# Patient Record
Sex: MALE | Race: WHITE | NOT HISPANIC OR LATINO | Employment: UNEMPLOYED | URBAN - METROPOLITAN AREA
[De-identification: names, ages, dates, MRNs, and addresses within clinical notes are randomized per-mention and may not be internally consistent; named-entity substitution may affect disease eponyms.]

---

## 2019-04-21 ENCOUNTER — HOSPITAL ENCOUNTER (INPATIENT)
Facility: HOSPITAL | Age: 19
LOS: 2 days | Discharge: HOME | DRG: 882 | End: 2019-04-23
Attending: PSYCHIATRY & NEUROLOGY | Admitting: PSYCHIATRY & NEUROLOGY
Payer: COMMERCIAL

## 2019-04-21 DIAGNOSIS — R45.851 DEPRESSION WITH SUICIDAL IDEATION: ICD-10-CM

## 2019-04-21 DIAGNOSIS — F39 MOOD DISORDER: Primary | ICD-10-CM

## 2019-04-21 DIAGNOSIS — F32.A DEPRESSION: ICD-10-CM

## 2019-04-21 DIAGNOSIS — F32.A DEPRESSION WITH SUICIDAL IDEATION: ICD-10-CM

## 2019-04-21 PROCEDURE — 11400000 HC PSYCH PRIVATE ROOM

## 2019-04-21 RX ORDER — ACETAMINOPHEN 325 MG/1
650 TABLET ORAL EVERY 6 HOURS PRN
Status: DISCONTINUED | OUTPATIENT
Start: 2019-04-21 | End: 2019-04-23 | Stop reason: HOSPADM

## 2019-04-21 RX ORDER — MAG HYDROX/ALUMINUM HYD/SIMETH 200-200-20
30 SUSPENSION, ORAL (FINAL DOSE FORM) ORAL EVERY 6 HOURS PRN
Status: DISCONTINUED | OUTPATIENT
Start: 2019-04-21 | End: 2019-04-23 | Stop reason: HOSPADM

## 2019-04-21 RX ORDER — LOPERAMIDE HYDROCHLORIDE 2 MG/1
2 CAPSULE ORAL
Status: DISCONTINUED | OUTPATIENT
Start: 2019-04-21 | End: 2019-04-23 | Stop reason: HOSPADM

## 2019-04-21 RX ORDER — OLANZAPINE 10 MG/2ML
10 INJECTION, POWDER, FOR SOLUTION INTRAMUSCULAR EVERY 4 HOURS PRN
Status: DISCONTINUED | OUTPATIENT
Start: 2019-04-21 | End: 2019-04-23 | Stop reason: HOSPADM

## 2019-04-21 RX ORDER — FOLIC ACID 1 MG/1
1 TABLET ORAL DAILY
Status: DISCONTINUED | OUTPATIENT
Start: 2019-04-22 | End: 2019-04-23 | Stop reason: HOSPADM

## 2019-04-21 RX ORDER — HYDROXYZINE PAMOATE 50 MG/1
50 CAPSULE ORAL EVERY 6 HOURS PRN
Status: DISCONTINUED | OUTPATIENT
Start: 2019-04-21 | End: 2019-04-23 | Stop reason: HOSPADM

## 2019-04-21 RX ORDER — OLANZAPINE 10 MG/1
10 TABLET ORAL EVERY 4 HOURS PRN
Status: DISCONTINUED | OUTPATIENT
Start: 2019-04-21 | End: 2019-04-23 | Stop reason: HOSPADM

## 2019-04-21 RX ORDER — IBUPROFEN 200 MG
1 TABLET ORAL DAILY PRN
Status: DISCONTINUED | OUTPATIENT
Start: 2019-04-21 | End: 2019-04-23 | Stop reason: HOSPADM

## 2019-04-21 RX ORDER — DOCUSATE SODIUM 100 MG/1
100 CAPSULE, LIQUID FILLED ORAL DAILY PRN
Status: DISCONTINUED | OUTPATIENT
Start: 2019-04-21 | End: 2019-04-23 | Stop reason: HOSPADM

## 2019-04-21 NOTE — PSYCH
Pt accepted for admission by Dr Gibbs.Report received from Florecita JOSEPH.Pt arrived to unit at 401 pm.Prior to entry on unit pt scanned per security wand.Upon entry on unit pt received a body assessment.Pt [facility] at [facility] today.Pt depressed over the break up with his girlfriend.Since the break up pt has had a loss of appetite,has been sleeping excessively,and has been agitated.ER doctor reports possible suicidal thoughts.Pt with hx of Bipolar,ADHD,and sub abuse.Pt reports he stopped drinking two weeks ago.Pt also reports he stopped using street drugs several years ago.Pt's girlfriend broke up with pt due to his violent angry out bursts.Pt became angry with girlfriend after she told him he could not go somewhere with her.Pt broke some thing he had given her.Pt said things he regrets.Pt reports this is a pattern with him.Pt reports he used to be on Seroquel but stopped taking due to it made him worse.Pt was told by his girlfriend he needed to get help for his out of control behavior.Pt also reports social anxiety.Pt given a unit tour and educated on unit rules and program.

## 2019-04-22 PROBLEM — F39 MOOD DISORDER: Status: ACTIVE | Noted: 2019-04-22

## 2019-04-22 LAB
CHOLEST SERPL-MCNC: 107 MG/DL (ref 120–199)
CHOLEST/HDLC SERPL: 2.5 {RATIO} (ref 2–5)
ESTIMATED AVG GLUCOSE: 94 MG/DL (ref 68–131)
HBA1C MFR BLD HPLC: 4.9 % (ref 4–5.6)
HDLC SERPL-MCNC: 43 MG/DL (ref 40–75)
HDLC SERPL: 40.2 % (ref 20–50)
LDLC SERPL CALC-MCNC: 58 MG/DL (ref 63–159)
NONHDLC SERPL-MCNC: 64 MG/DL
TRIGL SERPL-MCNC: 30 MG/DL (ref 30–150)

## 2019-04-22 PROCEDURE — 90833 PSYTX W PT W E/M 30 MIN: CPT | Mod: ,,, | Performed by: PSYCHIATRY & NEUROLOGY

## 2019-04-22 PROCEDURE — 99254 IP/OBS CNSLTJ NEW/EST MOD 60: CPT | Mod: ,,, | Performed by: NURSE PRACTITIONER

## 2019-04-22 PROCEDURE — 36415 COLL VENOUS BLD VENIPUNCTURE: CPT

## 2019-04-22 PROCEDURE — 80061 LIPID PANEL: CPT

## 2019-04-22 PROCEDURE — 11400000 HC PSYCH PRIVATE ROOM

## 2019-04-22 PROCEDURE — 99223 PR INITIAL HOSPITAL CARE,LEVL III: ICD-10-PCS | Mod: ,,, | Performed by: PSYCHIATRY & NEUROLOGY

## 2019-04-22 PROCEDURE — 83036 HEMOGLOBIN GLYCOSYLATED A1C: CPT

## 2019-04-22 PROCEDURE — 99254 PR INITIAL INPATIENT CONSULT,LEVL IV: ICD-10-PCS | Mod: ,,, | Performed by: NURSE PRACTITIONER

## 2019-04-22 PROCEDURE — 90833 PR PSYCHOTHERAPY W/PATIENT W/E&M, 30 MIN (ADD ON): ICD-10-PCS | Mod: ,,, | Performed by: PSYCHIATRY & NEUROLOGY

## 2019-04-22 PROCEDURE — 25000003 PHARM REV CODE 250: Performed by: PSYCHIATRY & NEUROLOGY

## 2019-04-22 PROCEDURE — 99223 1ST HOSP IP/OBS HIGH 75: CPT | Mod: ,,, | Performed by: PSYCHIATRY & NEUROLOGY

## 2019-04-22 RX ORDER — ESCITALOPRAM OXALATE 5 MG/1
5 TABLET ORAL DAILY
Status: DISCONTINUED | OUTPATIENT
Start: 2019-04-22 | End: 2019-04-23 | Stop reason: HOSPADM

## 2019-04-22 RX ORDER — CLONIDINE HYDROCHLORIDE 0.1 MG/1
0.1 TABLET ORAL 2 TIMES DAILY
Status: DISCONTINUED | OUTPATIENT
Start: 2019-04-22 | End: 2019-04-23 | Stop reason: HOSPADM

## 2019-04-22 RX ADMIN — ALUMINUM HYDROXIDE, MAGNESIUM HYDROXIDE, AND SIMETHICONE 30 ML: 200; 200; 20 SUSPENSION ORAL at 12:04

## 2019-04-22 RX ADMIN — FOLIC ACID 1 MG: 1 TABLET ORAL at 08:04

## 2019-04-22 RX ADMIN — THERA TABS 1 TABLET: TAB at 08:04

## 2019-04-22 RX ADMIN — ESCITALOPRAM OXALATE 5 MG: 5 TABLET, FILM COATED ORAL at 02:04

## 2019-04-22 RX ADMIN — CLONIDINE HYDROCHLORIDE 0.1 MG: 0.1 TABLET ORAL at 08:04

## 2019-04-22 NOTE — PLAN OF CARE
Problem: Adult Behavioral Health Plan of Care  Goal: Plan of Care Review  Outcome: Ongoing (interventions implemented as appropriate)  Pt has slept 9.5 hours with one interruption so far.  NAD.  Resp even & unlabored.  Pathways clear and bed is low.  Q 15 minute safety checks ongoing.  All precautions maintained.

## 2019-04-22 NOTE — SUBJECTIVE & OBJECTIVE
Past Medical History:   Diagnosis Date    Addiction to drug     ADHD (attention deficit hyperactivity disorder)     Alcohol abuse     Anxiety     Bipolar 1 disorder     Depression     History of psychiatric hospitalization     Hx of psychiatric care     Psychiatric problem     Sleep difficulties     Therapy        History reviewed. No pertinent surgical history.    Review of patient's allergies indicates:  No Known Allergies    No current facility-administered medications on file prior to encounter.      Current Outpatient Medications on File Prior to Encounter   Medication Sig    loratadine (CLARITIN) 10 mg tablet Take 1 tablet (10 mg total) by mouth once daily.     Family History     Problem Relation (Age of Onset)    ADD / ADHD Brother    Anxiety disorder Mother, Brother    Asthma Mother    Depression Mother, Brother    Hypertension Mother, Maternal Grandmother        Tobacco Use    Smoking status: Current Every Day Smoker     Packs/day: 0.25     Types: Cigarettes    Smokeless tobacco: Never Used   Substance and Sexual Activity    Alcohol use: Yes     Comment: until two weeks ago pt was drinking heavy daily    Drug use: No    Sexual activity: Not on file     Review of Systems   Constitutional: Negative for chills and fever.   Respiratory: Negative for chest tightness and shortness of breath.    Cardiovascular: Negative for chest pain and palpitations.   Gastrointestinal: Negative for abdominal distention, abdominal pain, blood in stool and vomiting.   Genitourinary: Negative for dysuria, flank pain, hematuria and urgency.   Musculoskeletal: Negative for back pain and neck pain.   Skin: Negative for rash and wound.   Neurological: Negative for dizziness, weakness and numbness.   Hematological: Does not bruise/bleed easily.   Psychiatric/Behavioral: Positive for self-injury. Negative for agitation and suicidal ideas. The patient is not nervous/anxious.      Objective:     Vital Signs (Most  Recent):  Temp: 98 °F (36.7 °C) (04/22/19 0800)  Pulse: 86 (04/22/19 0800)  Resp: 18 (04/22/19 0800)  BP: (!) 119/57 (04/22/19 0800) Vital Signs (24h Range):  Temp:  [97.2 °F (36.2 °C)-98.4 °F (36.9 °C)] 98 °F (36.7 °C)  Pulse:  [72-86] 86  Resp:  [16-18] 18  SpO2:  [99 %-100 %] 99 %  BP: (118-158)/(57-97) 119/57     Weight: 49.3 kg (108 lb 11 oz)  Body mass index is 17.02 kg/m².    Physical Exam   Constitutional: He is oriented to person, place, and time. He appears well-developed and well-nourished.   HENT:   Head: Normocephalic and atraumatic.   Neck: Normal range of motion. Neck supple. No thyromegaly present.   Cardiovascular: Normal rate, regular rhythm, normal heart sounds and intact distal pulses.   No murmur heard.  Pulmonary/Chest: Effort normal and breath sounds normal. No respiratory distress. He has no wheezes. He has no rales.   Abdominal: Soft. Bowel sounds are normal. He exhibits no distension. There is no tenderness.   Musculoskeletal: Normal range of motion. He exhibits no edema or deformity.   Neurological: He is alert and oriented to person, place, and time.   Neuro: Cranial nerves:  CN II Visual fields full to confrontation.   CN III, IV, VI Pupils are equal, round, and reactive to light.  CN III: no palsy  Nystagmus: none   Diplopia: none  Ophthalmoparesis: none  CN V Facial sensation intact.   CN VII Facial expression full, symmetric.   CN VIII normal.   CN IX normal.   CN X normal.   CN XI normal.   CN XII normal.     Skin: Skin is warm and dry.   Psychiatric: He has a normal mood and affect. His behavior is normal. Thought content normal.   Nursing note and vitals reviewed.      Significant Labs:   U/A negative  UDS  Results for orders placed or performed during the hospital encounter of 04/21/19   Drug screen panel, emergency   Result Value Ref Range    Benzodiazepines Negative     Methadone metabolites Negative     Cocaine (Metab.) Negative     Opiate Scrn, Ur Negative     Barbiturate  Screen, Ur Negative     Amphetamine Screen, Ur Negative     THC Negative     Phencyclidine Negative     Creatinine, Random Ur 59.8 23.0 - 375.0 mg/dL    Toxicology Information SEE COMMENT      CBC  Results for orders placed or performed during the hospital encounter of 04/21/19   CBC auto differential   Result Value Ref Range    WBC 7.51 3.90 - 12.70 K/uL    RBC 5.58 4.60 - 6.20 M/uL    Hemoglobin 17.0 14.0 - 18.0 g/dL    Hematocrit 51.4 40.0 - 54.0 %    MCV 92 82 - 98 fL    MCH 30.5 27.0 - 31.0 pg    MCHC 33.1 32.0 - 36.0 g/dL    RDW 13.5 11.5 - 14.5 %    Platelets 320 150 - 350 K/uL    MPV 9.5 9.2 - 12.9 fL    Immature Granulocytes 0.1 0.0 - 0.5 %    Gran # (ANC) 5.6 1.8 - 7.7 K/uL    Immature Grans (Abs) 0.01 0.00 - 0.04 K/uL    Lymph # 1.4 1.0 - 4.8 K/uL    Mono # 0.5 0.3 - 1.0 K/uL    Eos # 0.0 0.0 - 0.5 K/uL    Baso # 0.07 0.00 - 0.20 K/uL    nRBC 0 0 /100 WBC    Gran% 74.5 (H) 38.0 - 73.0 %    Lymph% 18.0 18.0 - 48.0 %    Mono% 6.1 4.0 - 15.0 %    Eosinophil% 0.4 0.0 - 8.0 %    Basophil% 0.9 0.0 - 1.9 %    Differential Method Automated      CMP  Results for orders placed or performed during the hospital encounter of 04/21/19   Comprehensive metabolic panel   Result Value Ref Range    Sodium 140 136 - 145 mmol/L    Potassium 3.8 3.5 - 5.1 mmol/L    Chloride 102 95 - 110 mmol/L    CO2 27 23 - 29 mmol/L    Glucose 105 70 - 110 mg/dL    BUN, Bld 10 6 - 20 mg/dL    Creatinine 0.8 0.5 - 1.4 mg/dL    Calcium 10.3 8.7 - 10.5 mg/dL    Total Protein 8.0 6.0 - 8.4 g/dL    Albumin 4.6 3.5 - 5.2 g/dL    Total Bilirubin 1.5 (H) 0.1 - 1.0 mg/dL    Alkaline Phosphatase 82 55 - 135 U/L    AST 18 10 - 40 U/L    ALT 18 10 - 44 U/L    Anion Gap 11 8 - 16 mmol/L    eGFR if African American >60.0 >60 mL/min/1.73 m^2    eGFR if non African American >60.0 >60 mL/min/1.73 m^2     TSH  Results for orders placed or performed during the hospital encounter of 04/21/19   TSH   Result Value Ref Range    TSH 0.581 0.400 - 4.000 uIU/mL      ETOH  Results for orders placed or performed during the hospital encounter of 04/21/19   Ethanol   Result Value Ref Range    Alcohol, Medical, Serum <10 <10 mg/dL       Acetaminophen  Results for orders placed or performed during the hospital encounter of 04/21/19   Acetaminophen level   Result Value Ref Range    Acetaminophen (Tylenol), Serum <3.0 (L) 10.0 - 20.0 ug/mL

## 2019-04-22 NOTE — PLAN OF CARE
Problem: Adult Behavioral Health Plan of Care  Goal: Plan of Care Review  Outcome: Ongoing (interventions implemented as appropriate)  Visible in the milieu for short periods, then goes into assigned room.  Restless, irritable.  Poor interaction with peers.  Stays to himself.  Denies SI/HI.  Appetite is fair.  Accepting meals and snacks.  Drinking fluids.  Encouraged to attend groups and participate in unit activities.  Pt verbalized understanding.  All precautions maintained.

## 2019-04-22 NOTE — HPI
17 yo male patient admitted to U from ER with c/o depression with suicidal ideation. VSS/afebrile. Labs reviewed

## 2019-04-22 NOTE — PLAN OF CARE
Problem: Adult Behavioral Health Plan of Care  Goal: Plan of Care Review  Outcome: Ongoing (interventions implemented as appropriate)  Pt remains depressed and withdrawn with minimal interaction observed.   Accepted medications, but was suspicious and questioned the purpose for meds.  Denies SI/HI at this time   Encouraged patient to remain compliant with meds/tx plan and to interact more with peers and staff.

## 2019-04-22 NOTE — PROGRESS NOTES
"   04/22/19 1040   [facility] Group Therapy   Group Name Leisure Skills Training   Specific Interventions Cognitive Stimulation Training   Participation Level Active;Sharing   Participation Quality Cooperative;Requires Prompting   Insight/Motivation Applies New Skills;Improved   Affect/Mood Display Depressed   Cognition Alert   Psychomotor WNL   Patient states "I feel better than yesterday. I'm good." Patient read (required some assistance), wrote and shared answers when asked, quiet unless approached.  "

## 2019-04-22 NOTE — CONSULTS
[facility] Hospital Medicine  Consult Note    Patient Name: Gomez Hernandez  MRN: [id number]  Admission Date: 4/21/2019  Hospital Length of Stay: 1 days  Attending Physician: Paco Gibbs MD   Primary Care Provider: Primary Doctor No           Patient information was obtained from patient and ER records.     Inpatient consult to [facility] for History and Physical  Consult performed by: Gema Chung NP  Consult ordered by: Paco Gibbs MD        Subjective:     Principal Problem: <principal problem not specified>    Chief Complaint: No chief complaint on file.       HPI: 19 yo male patient admitted to [facility] from ER with c/o depression with suicidal ideation. VSS/afebrile. Labs reviewed    Past Medical History:   Diagnosis Date    Addiction to drug     ADHD (attention deficit hyperactivity disorder)     Alcohol abuse     Anxiety     Bipolar 1 disorder     Depression     History of psychiatric hospitalization     Hx of psychiatric care     Psychiatric problem     Sleep difficulties     Therapy        History reviewed. No pertinent surgical history.    Review of patient's allergies indicates:  No Known Allergies    No current facility-administered medications on file prior to encounter.      Current Outpatient Medications on File Prior to Encounter   Medication Sig    loratadine (CLARITIN) 10 mg tablet Take 1 tablet (10 mg total) by mouth once daily.     Family History     Problem Relation (Age of Onset)    ADD / ADHD Brother    Anxiety disorder Mother, Brother    Asthma Mother    Depression Mother, Brother    Hypertension Mother, Maternal Grandmother        Tobacco Use    Smoking status: Current Every Day Smoker     Packs/day: 0.25     Types: Cigarettes    Smokeless tobacco: Never Used   Substance and Sexual Activity    Alcohol use: Yes     Comment: until two weeks ago pt was drinking heavy daily    Drug use: No    Sexual activity: Not on file     Review of  Systems   Constitutional: Negative for chills and fever.   Respiratory: Negative for chest tightness and shortness of breath.    Cardiovascular: Negative for chest pain and palpitations.   Gastrointestinal: Negative for abdominal distention, abdominal pain, blood in stool and vomiting.   Genitourinary: Negative for dysuria, flank pain, hematuria and urgency.   Musculoskeletal: Negative for back pain and neck pain.   Skin: Negative for rash and wound.   Neurological: Negative for dizziness, weakness and numbness.   Hematological: Does not bruise/bleed easily.   Psychiatric/Behavioral: Positive for self-injury. Negative for agitation and suicidal ideas. The patient is not nervous/anxious.      Objective:     Vital Signs (Most Recent):  Temp: 98 °F (36.7 °C) (04/22/19 0800)  Pulse: 86 (04/22/19 0800)  Resp: 18 (04/22/19 0800)  BP: (!) 119/57 (04/22/19 0800) Vital Signs (24h Range):  Temp:  [97.2 °F (36.2 °C)-98.4 °F (36.9 °C)] 98 °F (36.7 °C)  Pulse:  [72-86] 86  Resp:  [16-18] 18  SpO2:  [99 %-100 %] 99 %  BP: (118-158)/(57-97) 119/57     Weight: 49.3 kg (108 lb 11 oz)  Body mass index is 17.02 kg/m².    Physical Exam   Constitutional: He is oriented to person, place, and time. He appears well-developed and well-nourished.   HENT:   Head: Normocephalic and atraumatic.   Neck: Normal range of motion. Neck supple. No thyromegaly present.   Cardiovascular: Normal rate, regular rhythm, normal heart sounds and intact distal pulses.   No murmur heard.  Pulmonary/Chest: Effort normal and breath sounds normal. No respiratory distress. He has no wheezes. He has no rales.   Abdominal: Soft. Bowel sounds are normal. He exhibits no distension. There is no tenderness.   Musculoskeletal: Normal range of motion. He exhibits no edema or deformity.   Neurological: He is alert and oriented to person, place, and time.   Neuro: Cranial nerves:  CN II Visual fields full to confrontation.   CN III, IV, VI Pupils are equal, round, and  reactive to light.  CN III: no palsy  Nystagmus: none   Diplopia: none  Ophthalmoparesis: none  CN V Facial sensation intact.   CN VII Facial expression full, symmetric.   CN VIII normal.   CN IX normal.   CN X normal.   CN XI normal.   CN XII normal.     Skin: Skin is warm and dry.   Psychiatric: He has a normal mood and affect. His behavior is normal. Thought content normal.   Nursing note and vitals reviewed.      Significant Labs:   U/A negative  UDS  Results for orders placed or performed during the hospital encounter of 04/21/19   Drug screen panel, emergency   Result Value Ref Range    Benzodiazepines Negative     Methadone metabolites Negative     Cocaine (Metab.) Negative     Opiate Scrn, Ur Negative     Barbiturate Screen, Ur Negative     Amphetamine Screen, Ur Negative     THC Negative     Phencyclidine Negative     Creatinine, Random Ur 59.8 23.0 - 375.0 mg/dL    Toxicology Information SEE COMMENT      CBC  Results for orders placed or performed during the hospital encounter of 04/21/19   CBC auto differential   Result Value Ref Range    WBC 7.51 3.90 - 12.70 K/uL    RBC 5.58 4.60 - 6.20 M/uL    Hemoglobin 17.0 14.0 - 18.0 g/dL    Hematocrit 51.4 40.0 - 54.0 %    MCV 92 82 - 98 fL    MCH 30.5 27.0 - 31.0 pg    MCHC 33.1 32.0 - 36.0 g/dL    RDW 13.5 11.5 - 14.5 %    Platelets 320 150 - 350 K/uL    MPV 9.5 9.2 - 12.9 fL    Immature Granulocytes 0.1 0.0 - 0.5 %    Gran # (ANC) 5.6 1.8 - 7.7 K/uL    Immature Grans (Abs) 0.01 0.00 - 0.04 K/uL    Lymph # 1.4 1.0 - 4.8 K/uL    Mono # 0.5 0.3 - 1.0 K/uL    Eos # 0.0 0.0 - 0.5 K/uL    Baso # 0.07 0.00 - 0.20 K/uL    nRBC 0 0 /100 WBC    Gran% 74.5 (H) 38.0 - 73.0 %    Lymph% 18.0 18.0 - 48.0 %    Mono% 6.1 4.0 - 15.0 %    Eosinophil% 0.4 0.0 - 8.0 %    Basophil% 0.9 0.0 - 1.9 %    Differential Method Automated      CMP  Results for orders placed or performed during the hospital encounter of 04/21/19   Comprehensive metabolic panel   Result Value Ref Range     Sodium 140 136 - 145 mmol/L    Potassium 3.8 3.5 - 5.1 mmol/L    Chloride 102 95 - 110 mmol/L    CO2 27 23 - 29 mmol/L    Glucose 105 70 - 110 mg/dL    BUN, Bld 10 6 - 20 mg/dL    Creatinine 0.8 0.5 - 1.4 mg/dL    Calcium 10.3 8.7 - 10.5 mg/dL    Total Protein 8.0 6.0 - 8.4 g/dL    Albumin 4.6 3.5 - 5.2 g/dL    Total Bilirubin 1.5 (H) 0.1 - 1.0 mg/dL    Alkaline Phosphatase 82 55 - 135 U/L    AST 18 10 - 40 U/L    ALT 18 10 - 44 U/L    Anion Gap 11 8 - 16 mmol/L    eGFR if African American >60.0 >60 mL/min/1.73 m^2    eGFR if non African American >60.0 >60 mL/min/1.73 m^2     TSH  Results for orders placed or performed during the hospital encounter of 04/21/19   TSH   Result Value Ref Range    TSH 0.581 0.400 - 4.000 uIU/mL     ETOH  Results for orders placed or performed during the hospital encounter of 04/21/19   Ethanol   Result Value Ref Range    Alcohol, Medical, Serum <10 <10 mg/dL       Acetaminophen  Results for orders placed or performed during the hospital encounter of 04/21/19   Acetaminophen level   Result Value Ref Range    Acetaminophen (Tylenol), Serum <3.0 (L) 10.0 - 20.0 ug/mL             Assessment/Plan:     Depression  Further orders per psych        VTE Risk Mitigation (From admission, onward)    None              Thank you for your consult. I will sign off. Please contact us if you have any additional questions.    Gema Chung NP  Department of Hospital Medicine   [facility]
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
## 2019-04-23 VITALS
SYSTOLIC BLOOD PRESSURE: 118 MMHG | TEMPERATURE: 97 F | HEIGHT: 67 IN | WEIGHT: 108.69 LBS | RESPIRATION RATE: 18 BRPM | HEART RATE: 85 BPM | DIASTOLIC BLOOD PRESSURE: 71 MMHG | BODY MASS INDEX: 17.06 KG/M2

## 2019-04-23 PROCEDURE — 90833 PSYTX W PT W E/M 30 MIN: CPT | Mod: ,,, | Performed by: PSYCHIATRY & NEUROLOGY

## 2019-04-23 PROCEDURE — 99239 HOSP IP/OBS DSCHRG MGMT >30: CPT | Mod: ,,, | Performed by: PSYCHIATRY & NEUROLOGY

## 2019-04-23 PROCEDURE — 99239 PR HOSPITAL DISCHARGE DAY,>30 MIN: ICD-10-PCS | Mod: ,,, | Performed by: PSYCHIATRY & NEUROLOGY

## 2019-04-23 PROCEDURE — 25000003 PHARM REV CODE 250: Performed by: PSYCHIATRY & NEUROLOGY

## 2019-04-23 PROCEDURE — 90833 PR PSYCHOTHERAPY W/PATIENT W/E&M, 30 MIN (ADD ON): ICD-10-PCS | Mod: ,,, | Performed by: PSYCHIATRY & NEUROLOGY

## 2019-04-23 RX ORDER — IBUPROFEN 200 MG
1 TABLET ORAL DAILY
Status: ON HOLD | COMMUNITY
Start: 2019-04-23 | End: 2020-02-06 | Stop reason: SDUPTHER

## 2019-04-23 RX ORDER — ESCITALOPRAM OXALATE 5 MG/1
5 TABLET ORAL DAILY
Qty: 30 TABLET | Refills: 1 | Status: ON HOLD | OUTPATIENT
Start: 2019-04-24 | End: 2020-02-06 | Stop reason: HOSPADM

## 2019-04-23 RX ORDER — CLONIDINE HYDROCHLORIDE 0.1 MG/1
0.1 TABLET ORAL 2 TIMES DAILY
Qty: 60 TABLET | Refills: 1 | Status: ON HOLD | OUTPATIENT
Start: 2019-04-23 | End: 2020-02-06 | Stop reason: SDUPTHER

## 2019-04-23 RX ADMIN — ALUMINUM HYDROXIDE, MAGNESIUM HYDROXIDE, AND SIMETHICONE 30 ML: 200; 200; 20 SUSPENSION ORAL at 08:04

## 2019-04-23 RX ADMIN — CLONIDINE HYDROCHLORIDE 0.1 MG: 0.1 TABLET ORAL at 08:04

## 2019-04-23 RX ADMIN — FOLIC ACID 1 MG: 1 TABLET ORAL at 08:04

## 2019-04-23 RX ADMIN — THERA TABS 1 TABLET: TAB at 08:04

## 2019-04-23 RX ADMIN — ESCITALOPRAM OXALATE 5 MG: 5 TABLET, FILM COATED ORAL at 08:04

## 2019-04-23 NOTE — PLAN OF CARE
"Problem: Adult Behavioral Health Plan of Care  Goal: Rounds/Family Conference  TREATMENT TEAM UPDATE      Chief Complaint:  The patient was admitted due to depression. He had a negative UTOX.    Current:  The patient attended team dressed in personal clothing. He said he has ADHD and has difficulty focusing and often has trouble controlling his emotions. He said is now thinking and focusing better since starting the clonidine. He said he is "not as agitated or aggravated." He said he is no "overthinking" everything "like I usually do." He said he does have stress because his brother is incarcerated. He and his girlfriend have also recently ended his relationship, and he has decided to go stay with his mother upon discharge. He agreed to follow up with [facility].    Plan:  Discharge Patient              "

## 2019-04-23 NOTE — DISCHARGE SUMMARY
"Discharge Summary  Psychiatry    Admit Date: 4/21/2019    Discharge Date and Time:  04/23/2019 8:49 AM    Attending Physician: Paco Gibbs MD     Discharge Provider: Paco Gibbs MD    Reason for Admission:  depression and anger issues, "I need help with my ADHD      History of Present Illness:   The patient presented to the ER on 4/21/19 with complaints of depression. Per the ER and staff notes:   -17 yo male presents to ED for depression and "anger issues" onset "awhile" due to girlfriend breaking up with him.   -This is a 18 y.o. male with PMHx of ADHD, Bipolar Disorder, Drug Abuse who presents with depression x "couple of weeks." Pt states depression has worsened the last 4 days. Pt states his girlfriend broke up with him when he overreacted about something. Pt states he "has a short fuse." Per mother pt was living with his girlfriend and "came home like this". Pt endorses passive SI thought, intermittent agitation, increased sleep, reduced appetite, decreased concentration, and intermittent thoracic back pain. Pt reports muscle relaxers help his back. Pt denies HI, AVH, self injury, fever, sore throat, N/V/D, CP, ABD pain, or any other complaints. Pt reports taking seroquel. Pt reports he was last seen for mental health care 4 years ago. Pt reports he also was in drug rehab 3 years ago. Pt states "I need help now" and he would like to be admitted.   -Pt PECed at [facility] ER today.Pt depressed over the break up with his girlfriend.Since the break up pt has had a loss of appetite,has been sleeping excessively,and has been agitated.ER doctor reports possible suicidal thoughts.Pt with hx of Bipolar,ADHD,and sub abuse.Pt reports he stopped drinking two weeks ago.Pt also reports he stopped using street drugs several years ago.Pt's girlfriend broke up with pt due to his violent angry out bursts.Pt became angry with girlfriend after she told him he could not go somewhere with her.Pt broke some thing he " "had given her.Pt said things he regrets.Pt reports this is a pattern with him.Pt reports he used to be on Seroquel but stopped taking due to it made him worse.Pt was told by his girlfriend he needed to get help for his out of control behavior.Pt also reports social anxiety        The patient was medically cleared and admitted to the [facility].      The patient reports a long standing psychiatric history which led to hospitalizations starting at the age of 11 in the context of significant family dysfunction, "out of control behavior," ADHD and anger issues. He had numerous hospitalization during this time, which were likely in part from his substance use. After stopping all bu t alcohol at around the age of 15, he had no further inpatient stays until now. He reports that he quit drinking about 2 weeks ago.     He reports chronic mood lability, impulse control issues/impulsivity, and anger problems with a likley h/o ODD/Conduct Disorder. He also has some intellectual deficits per him based on education history.       He broke up with his girlfriend/"baby momma"   Last Friday, which caused an acute adjustment related decompensation with variable symptoms of depression/anxiety/mood as documented below.      He reports a likely h/o of ADHD comorbid with the following symptoms.      + Symptoms of Depression: +diminished mood or loss of interest/anhedonia; +irritability, +diminished energy, +change in sleep, +change in appetite, +diminished concentration or cognition or indecisiveness, no PMA/R, +excessive guilt or hopelessness or worthlessness, deniedsuicidal ideations     +changes in Sleep: denied trouble with initiation, maintenance or early awakenings; increased (slept 10.5 hours last night)     Denied Suicidal/Homicidal ideations: denied passive/active suicidal ideations, no organized plans, no future intentions; pt reported passive SI to other staff within the last 24 hours     Denied past current Symptoms of " psychosis: no hallucinations, no delusions, no disorganized thinking, no disorganized behavior or abnormal motor behavior, no negative symptoms      Denied past or current Symptoms of karyna or hypomania: denied elevated, expansive, or irritable mood with no increased energy or activity; denied inflated self-esteem or grandiosity, decreased need for sleep, increased rate of speech, FOI or racing thoughts, distractibility, increased goal directed activity or PMA, or risky/disinhibited behavior;    No symptoms of LYNN: excessive anxiety/worry/fear, more days than not, about numerous issues, difficult to control, with restlessness, fatigue, poor concentration, irritability, muscle tension, sleep disturbance; causes functionally impairing distress      +Symptoms of Panic Disorder: + recurrent panic attacks, always precipitated, +source of worry and/or behavioral changes secondary; without agoraphobia     Denied symptoms of PTSD: no h/o trauma; no re-experiencing/intrusive symptoms, avoidant behavior, negative alterations in cognition or mood, or hyperarousal symptoms; without dissociative symptoms      Denied Symptoms of OCD: no obsessions or compulsions      Denied symptoms of Eating Disorders: no anorexia, bulimia or binging     + Substance Use: +history of   intoxication, withdrawal, tolerance, used in larger amounts or duration than intended, unsuccessful attempts to limit or quit, increased time engaging in or seeking out, cravings or strong desire to use, failure to fulfill obligations, negative consequences in social/interpersonal/occupational,/recreational areas, use in dangerous situations, and medical or psychological consequences   -pt reports that he has been 2 weeks sober from alcohol and longer form cannabis and meth     +h/o of chronic impulsity, anger problems, and mood lability      +h/o ADHD        Procedures Performed: * No surgery found *    Hospital Course (synopsis of major diagnoses, care,  treatment, and services provided during the course of the hospital stay):   The patient was stabilized and discharged on the following medications:  Mood/Impulse control/ADHD: continue Re-trial of lexapro at 5 mg po q day; continue trial of off-label Clonidine 0.1 mg po BID     Substance use: pt counseled     Nicotine use: pt counseled; nicotine patch daily     Psychosocial stressors: pt counseled; SW consulted to assist with resources; refer to [place] [employer] and seeking housing resources     Underweight: pt counseled; dietician consulted     The patient was compliant with treatment. The patient denied any side effects.     Improved Symptoms of Depression: denied diminished mood or loss of interest/anhedonia; denied irritability, diminished energy, change in sleep, change in appetite, diminished concentration or cognition or indecisiveness, PMA/R, excessive guilt or hopelessness or worthlessness, or suicidal ideations     Improved changes in Sleep: denied trouble with initiation, maintenance or early awakenings; no hypersomnolence (slept 7-8 hours last night)     Denied Suicidal/Homicidal ideations: denied passive/active suicidal ideations, no organized plans, no future intentions; no SI reported since admission; he is future oriented toward a new job that he is going to start in 3 weekss     Denied past current Symptoms of psychosis: no hallucinations, no delusions, no disorganized thinking, no disorganized behavior or abnormal motor behavior, no negative symptoms      Denied  current Symptoms of karyna or hypomania: denied elevated, expansive, or irritable mood with no increased energy or activity; denied inflated self-esteem or grandiosity, decreased need for sleep, increased rate of speech, FOI or racing thoughts, distractibility, increased goal directed activity or PMA, or risky/disinhibited behavior;       Improved symptoms of Anxiety: no excessive anxiety/worry/fear, no panic attacks since admission, without  agoraphobia     +h/o of chronic impulsity, anger problems, and mood lability- cluster B spectrum symptoms      +h/o ADHD- pt reports less hyperactive and inattentive symptoms; pt noticed improvement with clonidine      Discussed diagnosis, risks and benefits of proposed treatment vs alternative treatments vs no treatment, and potential side effects of these treatments.  The patient expresses understanding of the above and displays the capacity to agree with this treatment given said understanding.  Patient also agrees that, currently, the benefits outweigh the risks and would like to pursue treatment at this time.    MSE: stated age, casually dressed, well groomed.  No psychomotor agitation or retardation.  No abnormal involuntary movements.  Gait normal.  Speech normal, conversational.  Language fluent English. Mood fine.  Affect normal range, pleasant, euthymic.  Thought process linear.  Associations intact.  Denies suicidal or homicidal ideation.  Denies auditory hallucinations, paranoid ideation, ideas of reference.  Memory intact.  Attention intact.  Fund of knowledge intact.  Insight intact.  Judgment intact.  Alert and oriented to person, place, time.      Tobacco Usage:  Is patient a smoker? Yes  Does patient want prescription for Tobacco Cessation? No  Does patient want counseling for Tobacco Cessation? No    If patient would like to quit, then over the counter nicotine patch could be used. The patient could also follow up with his PCP or psychiatric provider for other alternatives.     Final Diagnoses:    Principal Problem: Unspecified mood disorder     Secondary Diagnoses:   Impulse control disorder  Adjustment Disorder with mixed depressed and anxious features      ADHD, combined inattentive and hyperactive type     Polysubstance abuse (alcohol, meth, cannabis)  Nicotine Dependence      Psychosocial stressors (housing, unemployemnt)     Underweight         Labs:  Admission on 04/21/2019   Component Date  Value Ref Range Status    Cholesterol 04/22/2019 107* 120 - 199 mg/dL Final    Triglycerides 04/22/2019 30  30 - 150 mg/dL Final    HDL 04/22/2019 43  40 - 75 mg/dL Final    LDL Cholesterol 04/22/2019 58.0* 63.0 - 159.0 mg/dL Final    HDL/Chol Ratio 04/22/2019 40.2  20.0 - 50.0 % Final    Total Cholesterol/HDL Ratio 04/22/2019 2.5  2.0 - 5.0 Final    Non-HDL Cholesterol 04/22/2019 64  mg/dL Final    Hemoglobin A1C 04/22/2019 4.9  4.0 - 5.6 % Final    Estimated Avg Glucose 04/22/2019 94  68 - 131 mg/dL Final   Admission on 04/21/2019, Discharged on 04/21/2019   Component Date Value Ref Range Status    WBC 04/21/2019 7.51  3.90 - 12.70 K/uL Final    RBC 04/21/2019 5.58  4.60 - 6.20 M/uL Final    Hemoglobin 04/21/2019 17.0  14.0 - 18.0 g/dL Final    Hematocrit 04/21/2019 51.4  40.0 - 54.0 % Final    MCV 04/21/2019 92  82 - 98 fL Final    MCH 04/21/2019 30.5  27.0 - 31.0 pg Final    MCHC 04/21/2019 33.1  32.0 - 36.0 g/dL Final    RDW 04/21/2019 13.5  11.5 - 14.5 % Final    Platelets 04/21/2019 320  150 - 350 K/uL Final    MPV 04/21/2019 9.5  9.2 - 12.9 fL Final    Immature Granulocytes 04/21/2019 0.1  0.0 - 0.5 % Final    Gran # (ANC) 04/21/2019 5.6  1.8 - 7.7 K/uL Final    Immature Grans (Abs) 04/21/2019 0.01  0.00 - 0.04 K/uL Final    Lymph # 04/21/2019 1.4  1.0 - 4.8 K/uL Final    Mono # 04/21/2019 0.5  0.3 - 1.0 K/uL Final    Eos # 04/21/2019 0.0  0.0 - 0.5 K/uL Final    Baso # 04/21/2019 0.07  0.00 - 0.20 K/uL Final    nRBC 04/21/2019 0  0 /100 WBC Final    Gran% 04/21/2019 74.5* 38.0 - 73.0 % Final    Lymph% 04/21/2019 18.0  18.0 - 48.0 % Final    Mono% 04/21/2019 6.1  4.0 - 15.0 % Final    Eosinophil% 04/21/2019 0.4  0.0 - 8.0 % Final    Basophil% 04/21/2019 0.9  0.0 - 1.9 % Final    Differential Method 04/21/2019 Automated   Final    Sodium 04/21/2019 140  136 - 145 mmol/L Final    Potassium 04/21/2019 3.8  3.5 - 5.1 mmol/L Final    Chloride 04/21/2019 102  95 - 110 mmol/L  Final    CO2 04/21/2019 27  23 - 29 mmol/L Final    Glucose 04/21/2019 105  70 - 110 mg/dL Final    BUN, Bld 04/21/2019 10  6 - 20 mg/dL Final    Creatinine 04/21/2019 0.8  0.5 - 1.4 mg/dL Final    Calcium 04/21/2019 10.3  8.7 - 10.5 mg/dL Final    Total Protein 04/21/2019 8.0  6.0 - 8.4 g/dL Final    Albumin 04/21/2019 4.6  3.5 - 5.2 g/dL Final    Total Bilirubin 04/21/2019 1.5* 0.1 - 1.0 mg/dL Final    Alkaline Phosphatase 04/21/2019 82  55 - 135 U/L Final    AST 04/21/2019 18  10 - 40 U/L Final    ALT 04/21/2019 18  10 - 44 U/L Final    Anion Gap 04/21/2019 11  8 - 16 mmol/L Final    eGFR if African American 04/21/2019 >60.0  >60 mL/min/1.73 m^2 Final    eGFR if non African American 04/21/2019 >60.0  >60 mL/min/1.73 m^2 Final    TSH 04/21/2019 0.581  0.400 - 4.000 uIU/mL Final    Specimen UA 04/21/2019 Urine, Clean Catch   Final    Color, UA 04/21/2019 Yellow  Yellow, Straw, Sho Final    Appearance, UA 04/21/2019 Clear  Clear Final    pH, UA 04/21/2019 7.0  5.0 - 8.0 Final    Specific Gravity, UA 04/21/2019 1.010  1.005 - 1.030 Final    Protein, UA 04/21/2019 Negative  Negative Final    Glucose, UA 04/21/2019 Negative  Negative Final    Ketones, UA 04/21/2019 Negative  Negative Final    Bilirubin (UA) 04/21/2019 Negative  Negative Final    Occult Blood UA 04/21/2019 Negative  Negative Final    Nitrite, UA 04/21/2019 Negative  Negative Final    Leukocytes, UA 04/21/2019 Negative  Negative Final    Benzodiazepines 04/21/2019 Negative   Final    Methadone metabolites 04/21/2019 Negative   Final    Cocaine (Metab.) 04/21/2019 Negative   Final    Opiate Scrn, Ur 04/21/2019 Negative   Final    Barbiturate Screen, Ur 04/21/2019 Negative   Final    Amphetamine Screen, Ur 04/21/2019 Negative   Final    THC 04/21/2019 Negative   Final    Phencyclidine 04/21/2019 Negative   Final    Creatinine, Random Ur 04/21/2019 59.8  23.0 - 375.0 mg/dL Final    Toxicology Information 04/21/2019 SEE  COMMENT   Final    Alcohol, Medical, Serum 04/21/2019 <10  <10 mg/dL Final    Acetaminophen (Tylenol), Serum 04/21/2019 <3.0* 10.0 - 20.0 ug/mL Final         Discharged Condition: stable and improved; not currently a danger to self/others or gravely disabled    Disposition: Home or Self Care    Is patient being discharged on multiple neuroleptics? No    Follow Up/Patient Instructions:     Medications:  Reconciled Home Medications:      Medication List      START taking these medications    cloNIDine 0.1 MG tablet  Commonly known as:  CATAPRES  Take 1 tablet (0.1 mg total) by mouth 2 (two) times daily.     escitalopram oxalate 5 MG Tab  Commonly known as:  LEXAPRO  Take 1 tablet (5 mg total) by mouth once daily.  Start taking on:  4/24/2019     nicotine 14 mg/24 hr  Commonly known as:  NICODERM CQ  Place 1 patch onto the skin once daily.        STOP taking these medications    loratadine 10 mg tablet  Commonly known as:  CLARITIN          No discharge procedures on file.  Follow-up Information     [facility].    Specialties:  Psychology, Behavioral Health  Contact information:  [address] [place] [id number]  [phone]                     Diet: regular     Activity as tolerated    Total time spent discharging patient: 32 minutes    Paco Gibbs MD  Psychiatry

## 2019-04-23 NOTE — PLAN OF CARE
Problem: Adult Behavioral Health Plan of Care  Goal: Plan of Care Review  Outcome: Outcome(s) achieved Date Met: 04/23/19  Shift note : patient is stable for discharge today .

## 2019-04-23 NOTE — NURSING
Discharge note : avs reviewed with patient and he expresses understanding . He denies suicidal , homicidal ideations and is not gravely disabled . Family will pick him up and bring him home . He has all of his belongings .

## 2019-04-23 NOTE — PROGRESS NOTES
PSYCHOTHERAPY ADD-ON +[id number]   30 (16-37*) minutes    Time: 16 minutes  Participants: Met with patient    Therapeutic Intervention Type: behavior modifying psychotherapy, supportive psychotherapy  Why chosen therapy is appropriate versus another modality: relevant to diagnosis, patient responds to this modality, evidence based practice    Target symptoms: depression, anxiety   Primary focus: anger issues  Psychotherapeutic techniques: supportive techniques; identifying triggers and resources, safety plan and wrap up session    Outcome monitoring methods: self-report, observation    Patient's response to intervention:  The patient's response to intervention is accepting.    Progress toward goals:  The patient's progress toward goals is good.    Paco Gibbs MD  Psychiatry

## 2019-04-23 NOTE — PLAN OF CARE
"Admit/Discharge Note:    The patient was admitted because he wanted to see a psychiatrist and "this is the fastest way." He said he has difficulty managing his emotions and wanted to start medication.    He has a history of numerous previous hospitalizations related to substance use when he was a teen, though he reports 3 years of sobriety.    He denies SI, HI, and is not gravely disabled.    He agreed to follow up with [facility] and this was arranged for him.    PSA not completed because the patient discharged in less than 72 hours.  "

## 2019-04-23 NOTE — PLAN OF CARE
Problem: Adult Behavioral Health Plan of Care  Goal: Plan of Care Review  Outcome: Ongoing (interventions implemented as appropriate)  Pt calm and cooperative, disorganized thought process at times, denies SI at this time, appetite good, med compliant, safety precautions maintained ,will continue to monitor

## 2019-04-23 NOTE — PSYCH
Patient will be following up with [facility] at [address] [address] in [place], [phone].  Patient will go as a walk in on 4/24/2019 at 8 am.  Patient will receive tobacco cessation therapy.  Patient had a negative UDS on admit.  AVS faxed on 4- at 1:48 pm.

## 2020-01-30 PROBLEM — F29 PSYCHOSIS: Status: ACTIVE | Noted: 2020-01-30

## 2020-01-31 PROBLEM — F15.20 METHAMPHETAMINE USE DISORDER, MODERATE: Status: ACTIVE | Noted: 2020-01-31

## 2020-01-31 PROBLEM — F23 BRIEF PSYCHOTIC DISORDER: Status: ACTIVE | Noted: 2020-01-31

## 2020-04-29 ENCOUNTER — TELEPHONE (OUTPATIENT)
Dept: GASTROENTEROLOGY | Facility: CLINIC | Age: 20
End: 2020-04-29

## 2020-04-29 NOTE — TELEPHONE ENCOUNTER
Left message for patient to call the office. The patient needs to see hepatology. The patient lives in [place], [place] and can be seen by Yessica Hardy NP. Message sent to Yessica Hrady NP office to get patient scheduled.

## 2020-04-29 NOTE — TELEPHONE ENCOUNTER
----- Message from April Peck MA sent at 4/29/2020 10:13 AM CDT -----      ----- Message -----  From: Olayinka Parnell  Sent: 4/29/2020   7:50 AM CDT  To: [facility] Gastro Clinical Staff    Appointment Request From: Gomez Giles    With Provider: Gastroenterology    Preferred Date Range: 4/28/2020 - 4/30/2020    Preferred Times: Any time    Reason for visit: er    Comments:  went to the er an I need appointment my diagnoses were transaminitis an elevated lfts

## 2021-05-04 ENCOUNTER — PATIENT MESSAGE (OUTPATIENT)
Dept: RESEARCH | Facility: HOSPITAL | Age: 21
End: 2021-05-04

## 2022-09-15 PROBLEM — F15.959 METHAMPHETAMINE-INDUCED PSYCHOTIC DISORDER: Status: ACTIVE | Noted: 2022-09-15
